# Patient Record
Sex: MALE | Race: WHITE | NOT HISPANIC OR LATINO | URBAN - NONMETROPOLITAN AREA
[De-identification: names, ages, dates, MRNs, and addresses within clinical notes are randomized per-mention and may not be internally consistent; named-entity substitution may affect disease eponyms.]

---

## 2019-11-22 ENCOUNTER — IMPORTED ENCOUNTER (OUTPATIENT)
Dept: URBAN - NONMETROPOLITAN AREA CLINIC 1 | Facility: CLINIC | Age: 69
End: 2019-11-22

## 2019-11-22 PROBLEM — H47.323: Noted: 2019-11-22

## 2019-11-22 PROBLEM — Z96.1: Noted: 2019-11-22

## 2019-11-22 PROBLEM — E11.9: Noted: 2019-11-22

## 2019-11-22 PROBLEM — H52.03: Noted: 2019-11-22

## 2019-11-22 PROBLEM — H52.4: Noted: 2019-11-22

## 2019-11-22 PROBLEM — H52.223: Noted: 2019-11-22

## 2019-11-22 PROCEDURE — 92015 DETERMINE REFRACTIVE STATE: CPT

## 2019-11-22 PROCEDURE — 92014 COMPRE OPH EXAM EST PT 1/>: CPT

## 2019-11-22 NOTE — PATIENT DISCUSSION
Type II DM w/o Retinopathy -  discussed findings w/patient-  patient has been on insulin for past couple months. -  last A1C 6.3 10/2019.  BS fasting between 90 and 100.-  reviewed the importance of good blood sugar control and the negative effects of poorly controlled sugars on ocular health -  will send notes from today to Dr. Erna Garcia -  monitor yearly or prn Pseudophakia OU -  discussed findings w/patient-  stable at this time-  monitor yearly or prn Compound Hyperopic Astigmatism OU w/Presbyopia-  discussed findings w/patient-  new spectacle Rx issued-  monitor yearly or prn; 's Notes: MR 11/22/2019DFE 11/22/2019

## 2022-04-09 ASSESSMENT — TONOMETRY
OS_IOP_MMHG: 15
OD_IOP_MMHG: 15

## 2022-04-09 ASSESSMENT — VISUAL ACUITY
OS_SC: 20/20
OD_SC: 20/25

## 2023-09-27 ENCOUNTER — EMERGENCY VISIT (OUTPATIENT)
Dept: RURAL CLINIC 1 | Facility: CLINIC | Age: 73
End: 2023-09-27

## 2023-09-27 DIAGNOSIS — H10.423: ICD-10-CM

## 2023-09-27 DIAGNOSIS — E11.9: ICD-10-CM

## 2023-09-27 DIAGNOSIS — Z96.1: ICD-10-CM

## 2023-09-27 DIAGNOSIS — H47.323: ICD-10-CM

## 2023-09-27 DIAGNOSIS — Z98.890: ICD-10-CM

## 2023-09-27 PROCEDURE — 99213 OFFICE O/P EST LOW 20 MIN: CPT

## 2023-09-27 ASSESSMENT — TONOMETRY
OD_IOP_MMHG: 17
OS_IOP_MMHG: 17

## 2023-09-27 ASSESSMENT — VISUAL ACUITY
OS_CC: 20/20
OD_CC: 20/30

## 2024-11-21 ENCOUNTER — COMPREHENSIVE EXAM (OUTPATIENT)
Dept: URBAN - NONMETROPOLITAN AREA CLINIC 4 | Facility: CLINIC | Age: 74
End: 2024-11-21

## 2024-11-21 DIAGNOSIS — H52.03: ICD-10-CM

## 2024-11-21 DIAGNOSIS — Z98.890: ICD-10-CM

## 2024-11-21 DIAGNOSIS — H52.4: ICD-10-CM

## 2024-11-21 DIAGNOSIS — Z96.1: ICD-10-CM

## 2024-11-21 DIAGNOSIS — H52.203: ICD-10-CM

## 2024-11-21 DIAGNOSIS — E11.9: ICD-10-CM

## 2024-11-21 DIAGNOSIS — H47.323: ICD-10-CM

## 2024-11-21 PROCEDURE — 92014 COMPRE OPH EXAM EST PT 1/>: CPT

## 2024-11-21 PROCEDURE — 92015 DETERMINE REFRACTIVE STATE: CPT
